# Patient Record
Sex: MALE | Race: WHITE | Employment: UNEMPLOYED | URBAN - METROPOLITAN AREA
[De-identification: names, ages, dates, MRNs, and addresses within clinical notes are randomized per-mention and may not be internally consistent; named-entity substitution may affect disease eponyms.]

---

## 2017-08-13 ENCOUNTER — HOSPITAL ENCOUNTER (EMERGENCY)
Age: 47
Discharge: HOME | End: 2017-08-13
Attending: EMERGENCY MEDICINE
Payer: COMMERCIAL

## 2017-08-13 VITALS
DIASTOLIC BLOOD PRESSURE: 82 MMHG | TEMPERATURE: 98.2 F | WEIGHT: 215 LBS | RESPIRATION RATE: 20 BRPM | BODY MASS INDEX: 28.49 KG/M2 | OXYGEN SATURATION: 95 % | HEIGHT: 73 IN | SYSTOLIC BLOOD PRESSURE: 130 MMHG | HEART RATE: 54 BPM

## 2017-08-13 DIAGNOSIS — L25.9 CONTACT DERMATITIS AND OTHER ECZEMA, DUE TO UNSPECIFIED CAUSE: Primary | ICD-10-CM

## 2017-08-13 PROCEDURE — 99282 EMERGENCY DEPT VISIT SF MDM: CPT

## 2017-08-13 RX ORDER — TRIAMCINOLONE ACETONIDE 5 MG/G
CREAM TOPICAL 2 TIMES DAILY
Qty: 30 G | Refills: 1 | Status: SHIPPED | OUTPATIENT
Start: 2017-08-13 | End: 2021-06-12

## 2017-08-13 NOTE — Clinical Note
SPECIAL DISCHARGE INSTRUCTIONS AND COMMENTS: 
 
General comments: Thank you for allowing us to provide you with excellent care today. We hope we addressed all of your concerns and needs. We strive to provide excellent quality care in the Emergency Depart ment. If you feel that you have not received excellent quality care or timely care, please ask to speak to the nurse manager. Please choose us in the future for your continued health care needs. Follow-up comments: The exam and treatment you rece ived in the Emergency Department were for an urgent problem that may be new for you and / or one which may be related to a worsening of a chronic or ongoing medical problem that you already had prior to this visit. In any case, today's treatment is not i ntended to be considered as complete care in all cases and thus, it is important that you follow-up with a doctor, nurse practitioner, or physicians assistant to: (1) confirm your diagnosis, (2) re-evaluation of changes in your illness and treatment, an d (3) for ongoing care. In some cases we may have contacted your doctor or a specific specialist who may be involved in your future evaluation and care but in any case, take this sheet with you when you go to your follow-up visit or refer to the infor matt on these sheets when you are calling to arrange an appointment - it may prove helpful in making the appointment. Prescribed Medications: Unless you have been directed by the provider to change your current medicines, you should continue to edis e them as before. If you have been prescribed medicines, please take them as directed. In some cases, these new medicines are intended to replace a medicine that you are currently taking and if so, this will be noted below.  
 
 Our expectation is that y our condition will improve by following the doctor's recommendations, however, if in the event your condition worsens or does not improve as expected, please follow-up with your PCP or if unable to reach your usual health care provider, you should return  to the Emergency Department. We are available 24 hours a day. SPECIFIC INSTRUCTIONS PROVIDED BY YOUR DOCTOR, Clary Hopkins MD:  
Janki West TO CONSIDER DOING FOR AN ALLERGIC REACTION: 
 
FOR THE GENERAL REACTION: 
You may have been prescribed spec [facility] medication(s) to reverse the effects of an allergic reaction which you are having. Please take this medicine as directed for at least until the reaction has completely resolved or the time period directed by me. In addition to the prescribed med icine(s): You should also take Benadryl 25 - 50 mg 3 -4 times a day for the local and /or general reaction. This medicine may make you sleepy so take the lower dose initially. There are other medicines which work like Benadryl but should not make you sl eepy. They are Allegra, Zyrtec, and Claritin. If you purchase these medicines over the counter and start taking them, then it will NOT be necessary to take Benadryl on a regular basis. If you have been prescribe Hydroxyzine (Atarax), this medicine al so works like Benadryl to relieve the rash and itch sensation but maybe even more sedating than Benadryl. Therefore, it would be best to take this medicine before going to sleep. Other medicines that are useful for allergic reactions include either Za ntac or Pepcid which can be purchased at a drug [employer]. I realize that these medicines are \"stomach\" medicines but they have the additional effect of reversing the signs of an allergic reaction. Apply cool compresses to the area(s) as needed.  
 
FOR LO WINSTON SKIN OR EYE INVOLVEMENT: 
You may also have been prescribed or told about steroid creams to use for local skin reactions:  
 
1. For most prescribed steroid creams it is  BEST TO APPLY A THIN LAYER TO THE SKIN TWICE A DAY - ESPECIALLY AFTER JUST LIANET JEREMIE THE SKIN 2. PRECAUTIONS FOR STEROID CREAMS TO THE FACE - DO NOT APPLY ANY STEROID CREAM STRONGER THAN HYDROCORTISONE 1% ( WHICH IS THE STRONGEST CREAM YOU CAN PURCHASE AT A PHARMACY WITHOUT A PRESCRIPTION). LIMIT THE USE OF SUCH A CREAM TO THE FA CE TO ONLY 4 DAYS. If there is local eye involvement: 1. You may be prescribed an eye medication if the involvement is severe and if so, USE THE EYE DROPS AS DIRECTED. 2. THERE ARE EYE DROPS THAT YOU CAN PURCHASE OVER THE COUNTER THAT WILL RELI ANNALISE THE DISCOMFORT AND REDNESS ASSOCIATED WITH A LOCAL ALLERGIC REACTION. THEY ARE:   OPCON- A; NAPHCON-A,  [address] Use these medicines as directed on the bottle.

## 2017-08-13 NOTE — ED TRIAGE NOTES
Reports intermittent rash. States that it comes and goes in different areas of body. Has appt with dermatologist on 23rd.

## 2017-08-13 NOTE — ED PROVIDER NOTES
Date: 8/13/2017   Patient Name: Jer Burt   YOB: 1970  Medical Record Number: [id number]    HISTORY OF PRESENTING ILLNESS / COMPLAINT     Chief Complaint   Patient presents with    Rash        History Provided By:  patient    Barriers to Obtaining History:  NONE    Additional History: 7:21 AM   Jer Burt is a 41 y.o. male who presents to the emergency department C/O worsening intermittent generalized rash onset years ago. Aggravating factors include heat. Associated sxs include itchiness. Pt states his sxs are worse in the summer. Pt has never seen a provider for his sxs. Pt has appointment with dermatology on 8/23/17. Pt works at [employer] and says he is in hot temperatures regularly. Pt has used Cortizone and Zyrtec with no relief. Pt denies fever, chills and any other symptoms or complaints. Written by DIRK De Los Santos, as dictated by John Solomon MD     Primary Care Provider: None   Specialist:    PAST HISTORY     Past Medical History:   History reviewed. No pertinent past medical history. Past Surgical History:   Past Surgical History:   Procedure Laterality Date    HX ORTHOPAEDIC      knee, collarbone        Family History:   History reviewed. No pertinent family history. Social History:   Social History   Substance Use Topics    Smoking status: Current Some Day Smoker    Smokeless tobacco: None    Alcohol use No        Allergies: Allergies   Allergen Reactions    Other Medication Contact Dermatitis     Reports that ALL pain medications give him blisters. Review of Systems   Review of Systems   Constitutional: Negative for appetite change, chills and fever. HENT: Negative for congestion, sore throat and trouble swallowing. Eyes: Negative for redness. Respiratory: Negative for cough, shortness of breath and wheezing. Cardiovascular: Negative for chest pain.    Gastrointestinal: Negative for abdominal pain, constipation, diarrhea, nausea and vomiting. Genitourinary: Negative for difficulty urinating. Musculoskeletal: Negative for arthralgias. Skin: Positive for rash. (+) itchiness   Neurological: Negative for headaches. Hematological: Does not bruise/bleed easily. Physical Exam  Vitals:    08/13/17 0654   BP: 130/82   Pulse: (!) 54   Resp: 20   Temp: 98.2 °F (36.8 °C)   SpO2: 95%   Weight: 97.5 kg (215 lb)   Height: 6' 1\" (1.854 m)       Physical Exam    Vital signs and nursing notes reviewed  GEN:  Nontoxic appearing; Alert and Oriented; Appears well hydrated and with normal Cap Refill  NECK:  Supple. Trachea is Midline; No adenopathy. EXT:  No obvious soft tissue tenderness or sites of bony tenderness; Joints- good ROM  NEURO: CN- intact; No focal motor deficits; Cerebellar function- intact; DTR's - 2+ equal    FOCUSED EXAM: SKIN: areas on left UE, left inner thigh that are erythematous, slightly warm, no pustules or vesicles small papule on UE, no surround skin edema       INITIAL CLINICAL IMPRESSION and PLANS :  The patient presents with the primary complaint(s) of RECURRENT history of : rash. The presentation, to include historical aspects and clinical findings appear to be consistent with the DX of nonspecific dermatitis. However, other possible DX's to consider as primary, associated with, or exacerbated by include:    1. Contact dermatitis   2.  eczema     My initial focus is to Determine the cause and extent of the problem and Initiate Treatment as Appropriate . Considering the above, my initial management plan to evaluate and therapeutic interventions include: NO interventions as none are indicated  As well as those noted in the orders:      Reina William 27:  I have reviewed past medical records with pertinent portions incorporated below.   I reviewed the vital signs, available nursing notes, past medical history, past surgical history, family history and social history. I have spoken to other providers as described below. Old Medical Records: Nursing notes. Pertinent Input from Medical Records:    Diagnostic Study Results:     Labs -    No results found for this or any previous visit (from the past 12 hour(s)). Radiologic Studies -  The following have been ordered and reviewed:  No orders to display       ED COURSE:    Vital Signs-Reviewed the patient's vital signs. No data found. Pulse Oximetry Analysis - Normal 95% on RA     Cardiac Monitor:   Rate: 54 bpm  Rhythm: Sinus Bradycardia      Provider Notes:    7:21 AM    Initial assessment performed. I examined the patient and provided information regarding the scope of my initial clinical impression and plans for diagnostic and therapeutic intervention(s). I have encouraged them to ask questions as they arise throughout their visit. Sara Domingo MD      Procedures:   Procedures  FINAL CLINICAL IMPRESSION AND DISPOSITION DECISION  - DISCHARGE:  7:16 PM  I reviewed our electronic medical record system for any past medical records that were available that may contribute to the patients current condition, the nursing notes and vital signs from today's visit. Based on the clinical presentation and findings the clinical impression noted below is straight forward. Studies indicated and / or done during this ED encounter:  NONE INDICATED AT THIS TIME     RESULTS:   No orders to display       Labs Reviewed - No data to display   No results found for this or any previous visit (from the past 12 hour(s)). Intervention(s) while in ED: NONE INDICATED AT THIS TIME     MEDICATIONS GIVEN: Medications - No data to display    PROCEDURES:     Present condition:  STABLE    Planned Treatment Management Upon Discharge: SYMPTOMATIC TREATMENT    DISCUSSION RELATED TO ENCOUNTER: Encounter was straightforward and as noted above.     SPECIFIC CONVERSATIONS:     I feel that we have optimized outpatient assessment and management such that Padma Wang is stable to be discharged and to continue with him care or complete any additional evaluation as appropriate at home or as an outpatient. DISCHARGE NOTE:   Nathan Espinosa's  results have been reviewed with him. He  has been counseled regarding his  diagnosis. He  verbally conveys understanding and agreement of the signs, symptoms, diagnosis, treatment and prognosis and additionally agrees to follow up as recommended with None  In 24 - 48 hours. He  also agrees with the care-plan and conveys that all of his questions have been answered. I have also put together some discharge instructions for him that include: 1) educational information regarding their diagnosis, 2) how to care for their diagnosis at home, as well a 3) list of reasons why they would want to return to the ED prior to their follow-up appointment, should their condition change. The patient and/or family has been provided with education for proper Emergency Department utilization. CLINICAL IMPRESSION  1. Contact dermatitis and other eczema, due to unspecified cause          PLAN:  1. D/C home  2. Discharge Medication List as of 8/13/2017  7:44 AM      START taking these medications    Details   triamcinolone (ARISTOCORT) 0.5 % topical cream Apply  to affected area two (2) times a day. use thin layer, Print, Disp-30 g, R-1         CONTINUE these medications which have NOT CHANGED    Details   Cetirizine (ZYRTEC) 10 mg cap Take  by mouth., Historical Med           3.    Follow-up Information     Follow up With Details Comments Contact Info    your dermatologist Go to keep your already scheduled appointment     [facility] EMERGENCY DEPT Go to As needed, If symptoms worsen [address] [facility] [address]  [phone]        Return if sxs worsen    ATTESTATIONS:  This note is prepared by Joycelyn Gonzalez MD, acting as Scribe for Joycelyn Gonzalez MD.     Eduardo Ibarra Navin Mcdonnell MD: The scribe's documentation has been prepared under my direction and personally reviewed by me in its entirety. I confirm that the note above accurately reflects all work, treatment, procedures, and medical decision making performed by me.

## 2017-08-13 NOTE — DISCHARGE INSTRUCTIONS
Dermatitis: Care Instructions  Your Care Instructions  Dermatitis is the general name used for any rash or inflammation of the skin. Different kinds of dermatitis cause different kinds of rashes. Common causes of a rash include new medicines, plants (such as poison oak or poison ivy), heat, and stress. Certain illnesses can also cause a rash. An allergic reaction to something that touches your skin, such as latex, nickel, or poison ivy, is called contact dermatitis. Contact dermatitis may also be caused by something that irritates the skin, such as bleach, a chemical, or soap. These types of rashes cannot be spread from person to person. How long your rash will last depends on what caused it. Rashes may last a few days or months. Follow-up care is a key part of your treatment and safety. Be sure to make and go to all appointments, and call your doctor if you are having problems. It's also a good idea to know your test results and keep a list of the medicines you take. How can you care for yourself at home? · Do not scratch the rash. Cut your nails short, and file them smooth. Or wear gloves if this helps keep you from scratching. · Wash the area with water only. Pat dry. · Put cold, wet cloths on the rash to reduce itching. · Keep cool, and stay out of the sun. · Leave the rash open to the air as much as possible. · If the rash itches, use hydrocortisone cream. Follow the directions on the label. Calamine lotion may help for plant rashes. · Take an over-the-counter antihistamine, such as diphenhydramine (Benadryl) or loratadine (Claritin), to help calm the itching. Read and follow all instructions on the label. · If your doctor prescribed a cream, use it as directed. If your doctor prescribed medicine, take it exactly as directed. When should you call for help?   Call your doctor now or seek immediate medical care if:  · You have symptoms of infection, such as:  ¨ Increased pain, swelling, warmth, or redness. ¨ Red streaks leading from the area. ¨ Pus draining from the area. ¨ A fever. · You have joint pain along with the rash. Watch closely for changes in your health, and be sure to contact your doctor if:  · Your rash is changing or getting worse. · You are not getting better as expected. Where can you learn more? Go to http://chelsey-aron.info/. Enter (41) [phone] in the search box to learn more about \"Dermatitis: Care Instructions. \"  Current as of: October 13, 2016  Content Version: 11.3  © 2320-5677 [employer]. Care instructions adapted under license by "[employer] (which disclaims liability or warranty for this information). If you have questions about a medical condition or this instruction, always ask your healthcare professional. [address] any warranty or liability for your use of this information.

## 2021-06-12 ENCOUNTER — APPOINTMENT (OUTPATIENT)
Dept: CT IMAGING | Age: 51
End: 2021-06-12
Attending: EMERGENCY MEDICINE
Payer: COMMERCIAL

## 2021-06-12 ENCOUNTER — HOSPITAL ENCOUNTER (EMERGENCY)
Age: 51
Discharge: HOME | End: 2021-06-13
Attending: EMERGENCY MEDICINE
Payer: COMMERCIAL

## 2021-06-12 DIAGNOSIS — T50.902A INTENTIONAL DRUG OVERDOSE, INITIAL ENCOUNTER (HCC): ICD-10-CM

## 2021-06-12 DIAGNOSIS — F10.929 ALCOHOLIC INTOXICATION WITH COMPLICATION (HCC): Primary | ICD-10-CM

## 2021-06-12 LAB
ALBUMIN SERPL-MCNC: 4 G/DL (ref 3.4–5)
ALBUMIN/GLOB SERPL: 1.4 {RATIO} (ref 0.8–1.7)
ALP SERPL-CCNC: 48 U/L (ref 45–117)
ALT SERPL-CCNC: 24 U/L (ref 16–61)
ANION GAP SERPL CALC-SCNC: 13 MMOL/L (ref 3–18)
APAP SERPL-MCNC: <2 UG/ML (ref 10–30)
AST SERPL-CCNC: 18 U/L (ref 10–38)
BASOPHILS # BLD: 0 K/UL (ref 0–0.1)
BASOPHILS NFR BLD: 1 % (ref 0–2)
BILIRUB SERPL-MCNC: 0.2 MG/DL (ref 0.2–1)
BUN SERPL-MCNC: 9 MG/DL (ref 7–18)
BUN/CREAT SERPL: 12 (ref 12–20)
CALCIUM SERPL-MCNC: 7.9 MG/DL (ref 8.5–10.1)
CHLORIDE SERPL-SCNC: 103 MMOL/L (ref 100–111)
CO2 SERPL-SCNC: 20 MMOL/L (ref 21–32)
CREAT SERPL-MCNC: 0.78 MG/DL (ref 0.6–1.3)
DIFFERENTIAL METHOD BLD: ABNORMAL
EOSINOPHIL # BLD: 0.1 K/UL (ref 0–0.4)
EOSINOPHIL NFR BLD: 1 % (ref 0–5)
ERYTHROCYTE [DISTWIDTH] IN BLOOD BY AUTOMATED COUNT: 12.8 % (ref 11.6–14.5)
ETHANOL SERPL-MCNC: 305 MG/DL (ref 0–3)
GLOBULIN SER CALC-MCNC: 2.8 G/DL (ref 2–4)
GLUCOSE SERPL-MCNC: 93 MG/DL (ref 74–99)
HCT VFR BLD AUTO: 38.5 % (ref 36–48)
HGB BLD-MCNC: 12.8 G/DL (ref 13–16)
LIPASE SERPL-CCNC: 506 U/L (ref 73–393)
LYMPHOCYTES # BLD: 2 K/UL (ref 0.9–3.6)
LYMPHOCYTES NFR BLD: 30 % (ref 21–52)
MCH RBC QN AUTO: 28.8 PG (ref 24–34)
MCHC RBC AUTO-ENTMCNC: 33.2 G/DL (ref 31–37)
MCV RBC AUTO: 86.5 FL (ref 74–97)
MONOCYTES # BLD: 0.4 K/UL (ref 0.05–1.2)
MONOCYTES NFR BLD: 5 % (ref 3–10)
NEUTS SEG # BLD: 4.2 K/UL (ref 1.8–8)
NEUTS SEG NFR BLD: 63 % (ref 40–73)
PLATELET # BLD AUTO: 222 K/UL (ref 135–420)
PMV BLD AUTO: 9.1 FL (ref 9.2–11.8)
POTASSIUM SERPL-SCNC: 3.3 MMOL/L (ref 3.5–5.5)
PROT SERPL-MCNC: 6.8 G/DL (ref 6.4–8.2)
RBC # BLD AUTO: 4.45 M/UL (ref 4.35–5.65)
SALICYLATES SERPL-MCNC: 2.2 MG/DL (ref 2.8–20)
SODIUM SERPL-SCNC: 136 MMOL/L (ref 136–145)
WBC # BLD AUTO: 6.7 K/UL (ref 4.6–13.2)

## 2021-06-12 PROCEDURE — 85025 COMPLETE CBC W/AUTO DIFF WBC: CPT

## 2021-06-12 PROCEDURE — 99285 EMERGENCY DEPT VISIT HI MDM: CPT

## 2021-06-12 PROCEDURE — 93005 ELECTROCARDIOGRAM TRACING: CPT

## 2021-06-12 PROCEDURE — 70450 CT HEAD/BRAIN W/O DYE: CPT

## 2021-06-12 PROCEDURE — 80179 DRUG ASSAY SALICYLATE: CPT

## 2021-06-12 PROCEDURE — 80053 COMPREHEN METABOLIC PANEL: CPT

## 2021-06-12 PROCEDURE — 74011250636 HC RX REV CODE- 250/636: Performed by: EMERGENCY MEDICINE

## 2021-06-12 PROCEDURE — 83690 ASSAY OF LIPASE: CPT

## 2021-06-12 PROCEDURE — 90715 TDAP VACCINE 7 YRS/> IM: CPT | Performed by: EMERGENCY MEDICINE

## 2021-06-12 PROCEDURE — 80143 DRUG ASSAY ACETAMINOPHEN: CPT

## 2021-06-12 PROCEDURE — 90471 IMMUNIZATION ADMIN: CPT

## 2021-06-12 PROCEDURE — 82077 ASSAY SPEC XCP UR&BREATH IA: CPT

## 2021-06-12 RX ORDER — ESCITALOPRAM OXALATE 20 MG/1
20 TABLET ORAL DAILY
COMMUNITY

## 2021-06-12 RX ORDER — TRAZODONE HYDROCHLORIDE 100 MG/1
100 TABLET ORAL
COMMUNITY

## 2021-06-12 RX ADMIN — TETANUS TOXOID, REDUCED DIPHTHERIA TOXOID AND ACELLULAR PERTUSSIS VACCINE, ADSORBED 0.5 ML: 5; 2.5; 8; 8; 2.5 SUSPENSION INTRAMUSCULAR at 22:52

## 2021-06-13 VITALS
HEIGHT: 73 IN | RESPIRATION RATE: 13 BRPM | DIASTOLIC BLOOD PRESSURE: 77 MMHG | SYSTOLIC BLOOD PRESSURE: 120 MMHG | WEIGHT: 210 LBS | HEART RATE: 80 BPM | TEMPERATURE: 97.9 F | BODY MASS INDEX: 27.83 KG/M2 | OXYGEN SATURATION: 95 %

## 2021-06-13 LAB
AMPHET UR QL SCN: NEGATIVE
APPEARANCE UR: CLEAR
BARBITURATES UR QL SCN: NEGATIVE
BENZODIAZ UR QL: NEGATIVE
BILIRUB UR QL: NEGATIVE
CANNABINOIDS UR QL SCN: NEGATIVE
COCAINE UR QL SCN: NEGATIVE
COLOR UR: YELLOW
COVID-19 RAPID TEST, COVR: NOT DETECTED
ETHANOL SERPL-MCNC: 115 MG/DL (ref 0–3)
GLUCOSE UR STRIP.AUTO-MCNC: NEGATIVE MG/DL
HDSCOM,HDSCOM: NORMAL
HGB UR QL STRIP: NEGATIVE
KETONES UR QL STRIP.AUTO: NEGATIVE MG/DL
LEUKOCYTE ESTERASE UR QL STRIP.AUTO: NEGATIVE
METHADONE UR QL: NEGATIVE
NITRITE UR QL STRIP.AUTO: NEGATIVE
OPIATES UR QL: NEGATIVE
PCP UR QL: NEGATIVE
PH UR STRIP: 5.5 [PH] (ref 5–8)
PROT UR STRIP-MCNC: NEGATIVE MG/DL
SOURCE, COVRS: NORMAL
SP GR UR REFRACTOMETRY: <1.005 (ref 1–1.03)
UROBILINOGEN UR QL STRIP.AUTO: 0.2 EU/DL (ref 0.2–1)

## 2021-06-13 PROCEDURE — 82077 ASSAY SPEC XCP UR&BREATH IA: CPT

## 2021-06-13 PROCEDURE — 93005 ELECTROCARDIOGRAM TRACING: CPT

## 2021-06-13 PROCEDURE — 80307 DRUG TEST PRSMV CHEM ANLYZR: CPT

## 2021-06-13 PROCEDURE — 87635 SARS-COV-2 COVID-19 AMP PRB: CPT

## 2021-06-13 PROCEDURE — 81003 URINALYSIS AUTO W/O SCOPE: CPT

## 2021-06-13 PROCEDURE — 74011250636 HC RX REV CODE- 250/636: Performed by: EMERGENCY MEDICINE

## 2021-06-13 RX ADMIN — SODIUM CHLORIDE 1000 ML: 900 INJECTION, SOLUTION INTRAVENOUS at 00:31

## 2021-06-13 NOTE — ED NOTES
Pt sleeping and snoring in NAD;  Pt's O2 sats drop to high 80's;  Pt placed on O2 @ 2 lpm NC;  Pt tolerated well;  sats increased to mid 90's;  Provider Dr. Spring Buchanan aware
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
## 2021-06-13 NOTE — ED NOTES
Called poison control;  Recommends observation for 6-8 hours to watch for bradycardia; also recommends discharge EKG  Dr. Jennifer Naylor aware

## 2021-06-15 LAB
ATRIAL RATE: 53 BPM
ATRIAL RATE: 66 BPM
CALCULATED P AXIS, ECG09: 49 DEGREES
CALCULATED P AXIS, ECG09: 61 DEGREES
CALCULATED R AXIS, ECG10: 28 DEGREES
CALCULATED R AXIS, ECG10: 46 DEGREES
CALCULATED T AXIS, ECG11: 16 DEGREES
CALCULATED T AXIS, ECG11: 47 DEGREES
DIAGNOSIS, 93000: NORMAL
DIAGNOSIS, 93000: NORMAL
P-R INTERVAL, ECG05: 158 MS
P-R INTERVAL, ECG05: 158 MS
Q-T INTERVAL, ECG07: 444 MS
Q-T INTERVAL, ECG07: 476 MS
QRS DURATION, ECG06: 100 MS
QRS DURATION, ECG06: 96 MS
QTC CALCULATION (BEZET), ECG08: 446 MS
QTC CALCULATION (BEZET), ECG08: 465 MS
VENTRICULAR RATE, ECG03: 53 BPM
VENTRICULAR RATE, ECG03: 66 BPM